# Patient Record
Sex: FEMALE | Race: WHITE | NOT HISPANIC OR LATINO | URBAN - METROPOLITAN AREA
[De-identification: names, ages, dates, MRNs, and addresses within clinical notes are randomized per-mention and may not be internally consistent; named-entity substitution may affect disease eponyms.]

---

## 2017-05-01 ENCOUNTER — INPATIENT (INPATIENT)
Facility: HOSPITAL | Age: 77
LOS: 1 days | Discharge: HOME | End: 2017-05-03
Attending: FAMILY MEDICINE | Admitting: FAMILY MEDICINE
Payer: MEDICARE

## 2017-05-01 VITALS — HEIGHT: 65 IN | WEIGHT: 149.91 LBS

## 2017-05-01 PROCEDURE — 71010: CPT | Mod: 26

## 2017-05-01 PROCEDURE — 70450 CT HEAD/BRAIN W/O DYE: CPT | Mod: 26

## 2017-05-01 PROCEDURE — 76705 ECHO EXAM OF ABDOMEN: CPT | Mod: 26

## 2017-05-01 NOTE — ED PROVIDER NOTE - NEUROLOGICAL, MLM
Awake, nonverbal (consistent with baseline, as per family), +purposeful movements. Cranial nerves grossly intact.

## 2017-05-01 NOTE — ED PROVIDER NOTE - MEDICAL DECISION MAKING DETAILS
Elderly woman with alzheimers dementia, presenting s/p episode urinary incontinence. Family with pt denies lethargy, fevers, vomiting, acute AMS. Physical exam unremarkable except for evidence of advanced dementia.    Plan labs including CBC, CMP, UA, observe, reassess. Elderly woman with alzheimers dementia, presenting s/p episode urinary incontinence. Family with pt denies lethargy, fevers, vomiting, acute AMS. Physical exam unremarkable except for evidence of advanced dementia.    Plan labs including CBC, CMP, UA, observe, reassess.  Elevated LFTs & TBili --> RUQ abd sono.

## 2017-05-01 NOTE — ED PROVIDER NOTE - ENMT, MLM
Airway patent, Nasal mucosa clear. Mouth with normal mucosa. Oropharynx poorly visualized due to pt noncompliance with exam. Neck is supple and non-tender.

## 2017-05-01 NOTE — ED PROVIDER NOTE - DETAILS:
The scribe's documentation has been prepared under my direction and personally reviewed by me in its entirety. I confirm that the note above accurately reflects all work, treatment, procedures, and medical decision making performed by me (Dr. Stinson).

## 2017-05-01 NOTE — ED PROVIDER NOTE - PROGRESS NOTE DETAILS
Dr. Stinson:  Case signed out to Dr. Gaspar:  [] RUQ abd. sono,  [] expect pt TBD unless sono shows signif. pathology. Case DW Dr. Mcnair and pt will be admitted to the Hospitalist service.

## 2017-05-01 NOTE — ED ADULT NURSE REASSESSMENT NOTE - NS ED NURSE REASSESS COMMENT FT1
Pt received in bed. Appears to be comfortable. Pt nonverbal. CONCEPCION x 4. Does not follow commands. Breathing spontaneously on RA. No SOB or cough. VS as documented. +PPS x 4. Cap refill < 2 secs. abd soft, nondistended, nontender. +BS in all 4 quad. Incontinent of B&B. Skin warm, dry, and intact. Will continue to monitor.

## 2017-05-01 NOTE — ED PROVIDER NOTE - NS ED MD SCRIBE ATTENDING SCRIBE SECTIONS
PAST MEDICAL/SURGICAL/SOCIAL HISTORY/PROGRESS NOTE/PHYSICAL EXAM/RESULTS/DISPOSITION/REVIEW OF SYSTEMS/HISTORY OF PRESENT ILLNESS

## 2017-05-01 NOTE — ED STATDOCS - PROGRESS NOTE DETAILS
78 y/o F presents to the ED s/p urinary incontinence. The pt's sister provides that this has been happening since a few days. Pt's sister notes that she has been slightly confused. Currently pt has increased confusion and has not been responding to questions which is not usual. No h/o fever, chills, dizziness, abd pain, nvd, back injury, neck injury, cp, cough, sob or rash. PMD Dr. Landeros. Further eval in Main ED. Please note that orders have been placed by Dr. Redmond

## 2017-05-01 NOTE — ED PROVIDER NOTE - OBJECTIVE STATEMENT
76 y/o F presents to the ED for frequent UTIs, urinary incontinence, and increased confusion over the passed few days. PMD Dr. Landeros. 76 y/o F with a h/o dementia, presents to the ED for frequent UTIs, BIB family for an episode of urinary incontinence today. Family member states pt had 2 episodes of urinary incontinence in the passed 6 months. No recent falls or trauma. No fevers or vomiting. Good appetite. At baseline, pt talks less in the morning. PMD Dr. Landeros, Dr. Cabrales (cardio).

## 2017-05-01 NOTE — ED ADULT NURSE NOTE - OBJECTIVE STATEMENT
Per son Yared and sister Faith, pt has new confusion.  Incontinent of urine, difficulty following commands, steady on feet.

## 2017-05-02 DIAGNOSIS — G30.9 ALZHEIMER'S DISEASE, UNSPECIFIED: ICD-10-CM

## 2017-05-02 DIAGNOSIS — R94.5 ABNORMAL RESULTS OF LIVER FUNCTION STUDIES: ICD-10-CM

## 2017-05-02 DIAGNOSIS — I25.119 ATHEROSCLEROTIC HEART DISEASE OF NATIVE CORONARY ARTERY WITH UNSPECIFIED ANGINA PECTORIS: ICD-10-CM

## 2017-05-02 DIAGNOSIS — R32 UNSPECIFIED URINARY INCONTINENCE: ICD-10-CM

## 2017-05-02 DIAGNOSIS — K83.8 OTHER SPECIFIED DISEASES OF BILIARY TRACT: ICD-10-CM

## 2017-05-02 LAB
ADD ON TEST-SPECIMEN IN LAB: SIGNIFICANT CHANGE UP
AFP-TM SERPL-MCNC: 1.9 NG/ML — SIGNIFICANT CHANGE UP
ALBUMIN SERPL ELPH-MCNC: 2.9 G/DL — LOW (ref 3.3–5)
ALP SERPL-CCNC: 345 U/L — HIGH (ref 40–120)
ALT FLD-CCNC: 132 U/L — HIGH (ref 12–78)
AMYLASE P1 CFR SERPL: 28 U/L — SIGNIFICANT CHANGE UP (ref 25–115)
ANION GAP SERPL CALC-SCNC: 9 MMOL/L — SIGNIFICANT CHANGE UP (ref 5–17)
AST SERPL-CCNC: 120 U/L — HIGH (ref 15–37)
BASOPHILS # BLD AUTO: 0.1 K/UL — SIGNIFICANT CHANGE UP (ref 0–0.2)
BASOPHILS NFR BLD AUTO: 1.4 % — SIGNIFICANT CHANGE UP (ref 0–2)
BILIRUB DIRECT SERPL-MCNC: 1 MG/DL — HIGH (ref 0–0.2)
BILIRUB INDIRECT FLD-MCNC: 1.2 MG/DL — HIGH (ref 0.2–1)
BILIRUB SERPL-MCNC: 2.2 MG/DL — HIGH (ref 0.2–1.2)
BUN SERPL-MCNC: 10 MG/DL — SIGNIFICANT CHANGE UP (ref 7–23)
CALCIUM SERPL-MCNC: 9.2 MG/DL — SIGNIFICANT CHANGE UP (ref 8.5–10.1)
CEA SERPL-MCNC: 0.8 NG/ML — SIGNIFICANT CHANGE UP (ref 0–3.8)
CHLORIDE SERPL-SCNC: 108 MMOL/L — SIGNIFICANT CHANGE UP (ref 96–108)
CO2 SERPL-SCNC: 27 MMOL/L — SIGNIFICANT CHANGE UP (ref 22–31)
CREAT SERPL-MCNC: 0.85 MG/DL — SIGNIFICANT CHANGE UP (ref 0.5–1.3)
CULTURE RESULTS: SIGNIFICANT CHANGE UP
EOSINOPHIL # BLD AUTO: 0.3 K/UL — SIGNIFICANT CHANGE UP (ref 0–0.5)
EOSINOPHIL NFR BLD AUTO: 3.9 % — SIGNIFICANT CHANGE UP (ref 0–6)
GLUCOSE SERPL-MCNC: 112 MG/DL — HIGH (ref 70–99)
HAV IGM SER-ACNC: SIGNIFICANT CHANGE UP
HBA1C BLD-MCNC: 6.3 % — HIGH (ref 4–5.6)
HBV CORE IGM SER-ACNC: SIGNIFICANT CHANGE UP
HBV SURFACE AG SER-ACNC: SIGNIFICANT CHANGE UP
HCT VFR BLD CALC: 39.2 % — SIGNIFICANT CHANGE UP (ref 34.5–45)
HCV AB S/CO SERPL IA: 0.14 S/CO — SIGNIFICANT CHANGE UP
HCV AB SERPL-IMP: SIGNIFICANT CHANGE UP
HGB BLD-MCNC: 13.3 G/DL — SIGNIFICANT CHANGE UP (ref 11.5–15.5)
LIDOCAIN IGE QN: 121 U/L — SIGNIFICANT CHANGE UP (ref 73–393)
LYMPHOCYTES # BLD AUTO: 1.8 K/UL — SIGNIFICANT CHANGE UP (ref 1–3.3)
LYMPHOCYTES # BLD AUTO: 23.9 % — SIGNIFICANT CHANGE UP (ref 13–44)
MAGNESIUM SERPL-MCNC: 2.1 MG/DL — SIGNIFICANT CHANGE UP (ref 1.8–2.4)
MCHC RBC-ENTMCNC: 32 PG — SIGNIFICANT CHANGE UP (ref 27–34)
MCHC RBC-ENTMCNC: 33.9 GM/DL — SIGNIFICANT CHANGE UP (ref 32–36)
MCV RBC AUTO: 94.4 FL — SIGNIFICANT CHANGE UP (ref 80–100)
MONOCYTES # BLD AUTO: 0.7 K/UL — SIGNIFICANT CHANGE UP (ref 0–0.9)
MONOCYTES NFR BLD AUTO: 10.1 % — SIGNIFICANT CHANGE UP (ref 2–14)
NEUTROPHILS # BLD AUTO: 4.5 K/UL — SIGNIFICANT CHANGE UP (ref 1.8–7.4)
NEUTROPHILS NFR BLD AUTO: 60.7 % — SIGNIFICANT CHANGE UP (ref 43–77)
PLATELET # BLD AUTO: 190 K/UL — SIGNIFICANT CHANGE UP (ref 150–400)
POTASSIUM SERPL-MCNC: 4 MMOL/L — SIGNIFICANT CHANGE UP (ref 3.5–5.3)
POTASSIUM SERPL-SCNC: 4 MMOL/L — SIGNIFICANT CHANGE UP (ref 3.5–5.3)
PROT SERPL-MCNC: 6.8 GM/DL — SIGNIFICANT CHANGE UP (ref 6–8.3)
RBC # BLD: 4.15 M/UL — SIGNIFICANT CHANGE UP (ref 3.8–5.2)
RBC # FLD: 12.4 % — SIGNIFICANT CHANGE UP (ref 10.3–14.5)
SODIUM SERPL-SCNC: 144 MMOL/L — SIGNIFICANT CHANGE UP (ref 135–145)
SPECIMEN SOURCE: SIGNIFICANT CHANGE UP
TSH SERPL-MCNC: <0.005 UIU/ML — LOW (ref 0.36–3.74)
WBC # BLD: 7.4 K/UL — SIGNIFICANT CHANGE UP (ref 3.8–10.5)
WBC # FLD AUTO: 7.4 K/UL — SIGNIFICANT CHANGE UP (ref 3.8–10.5)

## 2017-05-02 PROCEDURE — 99285 EMERGENCY DEPT VISIT HI MDM: CPT

## 2017-05-02 RX ORDER — METOPROLOL TARTRATE 50 MG
1 TABLET ORAL
Qty: 0 | Refills: 0 | COMMUNITY

## 2017-05-02 RX ORDER — ERGOCALCIFEROL 1.25 MG/1
1 CAPSULE ORAL
Qty: 0 | Refills: 0 | COMMUNITY

## 2017-05-02 RX ORDER — METOPROLOL TARTRATE 50 MG
25 TABLET ORAL DAILY
Qty: 0 | Refills: 0 | Status: DISCONTINUED | OUTPATIENT
Start: 2017-05-02 | End: 2017-05-03

## 2017-05-02 RX ORDER — ATORVASTATIN CALCIUM 80 MG/1
1 TABLET, FILM COATED ORAL
Qty: 0 | Refills: 0 | COMMUNITY

## 2017-05-02 RX ORDER — DEXTROSE 50 % IN WATER 50 %
12.5 SYRINGE (ML) INTRAVENOUS ONCE
Qty: 0 | Refills: 0 | Status: DISCONTINUED | OUTPATIENT
Start: 2017-05-02 | End: 2017-05-03

## 2017-05-02 RX ORDER — DORZOLAMIDE HYDROCHLORIDE 20 MG/ML
1 SOLUTION/ DROPS OPHTHALMIC
Qty: 0 | Refills: 0 | COMMUNITY

## 2017-05-02 RX ORDER — MEMANTINE HYDROCHLORIDE 10 MG/1
1 TABLET ORAL
Qty: 0 | Refills: 0 | COMMUNITY

## 2017-05-02 RX ORDER — SODIUM CHLORIDE 9 MG/ML
1000 INJECTION, SOLUTION INTRAVENOUS
Qty: 0 | Refills: 0 | Status: DISCONTINUED | OUTPATIENT
Start: 2017-05-02 | End: 2017-05-03

## 2017-05-02 RX ORDER — ATORVASTATIN CALCIUM 80 MG/1
20 TABLET, FILM COATED ORAL AT BEDTIME
Qty: 0 | Refills: 0 | Status: DISCONTINUED | OUTPATIENT
Start: 2017-05-02 | End: 2017-05-03

## 2017-05-02 RX ORDER — L.ACIDOPH/B.ANIMALIS/B.LONGUM 15B CELL
0 CAPSULE ORAL
Qty: 0 | Refills: 0 | COMMUNITY

## 2017-05-02 RX ORDER — MEMANTINE HYDROCHLORIDE 10 MG/1
10 TABLET ORAL
Qty: 0 | Refills: 0 | Status: DISCONTINUED | OUTPATIENT
Start: 2017-05-02 | End: 2017-05-03

## 2017-05-02 RX ORDER — DONEPEZIL HYDROCHLORIDE 10 MG/1
1 TABLET, FILM COATED ORAL
Qty: 0 | Refills: 0 | COMMUNITY

## 2017-05-02 RX ORDER — CLOPIDOGREL BISULFATE 75 MG/1
1 TABLET, FILM COATED ORAL
Qty: 0 | Refills: 0 | COMMUNITY

## 2017-05-02 RX ORDER — CLOPIDOGREL BISULFATE 75 MG/1
75 TABLET, FILM COATED ORAL DAILY
Qty: 0 | Refills: 0 | Status: DISCONTINUED | OUTPATIENT
Start: 2017-05-02 | End: 2017-05-03

## 2017-05-02 RX ORDER — DONEPEZIL HYDROCHLORIDE 10 MG/1
10 TABLET, FILM COATED ORAL AT BEDTIME
Qty: 0 | Refills: 0 | Status: DISCONTINUED | OUTPATIENT
Start: 2017-05-02 | End: 2017-05-03

## 2017-05-02 RX ORDER — DORZOLAMIDE HYDROCHLORIDE 20 MG/ML
1 SOLUTION/ DROPS OPHTHALMIC
Qty: 0 | Refills: 0 | Status: DISCONTINUED | OUTPATIENT
Start: 2017-05-02 | End: 2017-05-03

## 2017-05-02 RX ORDER — GLUCAGON INJECTION, SOLUTION 0.5 MG/.1ML
1 INJECTION, SOLUTION SUBCUTANEOUS ONCE
Qty: 0 | Refills: 0 | Status: DISCONTINUED | OUTPATIENT
Start: 2017-05-02 | End: 2017-05-03

## 2017-05-02 RX ORDER — INSULIN LISPRO 100/ML
VIAL (ML) SUBCUTANEOUS
Qty: 0 | Refills: 0 | Status: DISCONTINUED | OUTPATIENT
Start: 2017-05-02 | End: 2017-05-03

## 2017-05-02 RX ORDER — HALOPERIDOL DECANOATE 100 MG/ML
1 INJECTION INTRAMUSCULAR ONCE
Qty: 0 | Refills: 0 | Status: COMPLETED | OUTPATIENT
Start: 2017-05-02 | End: 2017-05-02

## 2017-05-02 RX ORDER — DEXTROSE 50 % IN WATER 50 %
1 SYRINGE (ML) INTRAVENOUS ONCE
Qty: 0 | Refills: 0 | Status: DISCONTINUED | OUTPATIENT
Start: 2017-05-02 | End: 2017-05-03

## 2017-05-02 RX ADMIN — DONEPEZIL HYDROCHLORIDE 10 MILLIGRAM(S): 10 TABLET, FILM COATED ORAL at 21:52

## 2017-05-02 RX ADMIN — Medication 1 TABLET(S): at 11:47

## 2017-05-02 RX ADMIN — MEMANTINE HYDROCHLORIDE 10 MILLIGRAM(S): 10 TABLET ORAL at 17:31

## 2017-05-02 RX ADMIN — CLOPIDOGREL BISULFATE 75 MILLIGRAM(S): 75 TABLET, FILM COATED ORAL at 11:47

## 2017-05-02 RX ADMIN — DORZOLAMIDE HYDROCHLORIDE 1 DROP(S): 20 SOLUTION/ DROPS OPHTHALMIC at 17:31

## 2017-05-02 RX ADMIN — Medication 1: at 14:41

## 2017-05-02 RX ADMIN — ATORVASTATIN CALCIUM 20 MILLIGRAM(S): 80 TABLET, FILM COATED ORAL at 21:52

## 2017-05-02 NOTE — H&P ADULT - NSHPPHYSICALEXAM_GEN_ALL_CORE
PHYSICAL EXAM:      Constitutional: NAD, ill-groomed, well-developed  HEENT: PERRLA, EOMI, Normal Hearing  Neck: No LAD, No JVD  Back: Normal spine flexure, No CVA tenderness  Respiratory: CTABL  Cardiovascular: S1 and S2, RRR, no M/G/R  Gastrointestinal: BS+, soft, NT/ND  Extremities: No peripheral edema  Vascular: 2+ peripheral pulses  Neurological: A/O x 1, pt moving all limbs  Psychiatric: confused  Skin: No rashes

## 2017-05-02 NOTE — PATIENT PROFILE ADULT. - VISION (WITH CORRECTIVE LENSES IF THE PATIENT USUALLY WEARS THEM):
Normal vision: sees adequately in most situations; can see medication labels, newsprint/unable to assess r/t advanced dementia

## 2017-05-02 NOTE — GOALS OF CARE CONVERSATION - PERSONAL ADVANCE DIRECTIVE - TREATMENT GUIDELINE COMMENT
Plan left to see what GI workup reveals to help decide which leel of home care is most appropriate.  With dx of advance dementia pt is on cusp of eligibility for hospice but GI  dx may change that picture.      Spent 47 minutes speaking with pts HCP Martina re: advance care planning, advance directives, therapeutic alternatives and home care options

## 2017-05-02 NOTE — GOALS OF CARE CONVERSATION - PERSONAL ADVANCE DIRECTIVE - CONVERSATION DETAILS
reviewed the role of palliative medicine, pts course over last several years and current condition.  Pt at baseline cognitively but with new finding of bile duct dilatation.  Awaiting GI input for further workup- Martina notes she is agreeable to further workup but does not wish for anything too invasive per her mother's wishes.  Re-affirmed preference for DNR and DNI order.  We did also review 3 levels of home care including basic, palliative and hospice home care.

## 2017-05-02 NOTE — CONSULT NOTE ADULT - SUBJECTIVE AND OBJECTIVE BOX
HPI: Pt is a 77y old Female with hx of dementia FAST 6E/7A, CAD s/p NSTEMI, HLD admitted from home brought in by sister for episode of urinary incontinence.  Pt noted to have elevated LFTS on admission and imaging revealed dilated intra and extrahepatic bile ducts.    Pt is unable to provide any hx due to advanced dementia but daughter/HCP Martina present at bedside at time of my visit.    Martina explains pt has had issues with cognition dating back 18 yrs when her  .  She presently lives with her 83 yo sister and they have no extra help.  Martina states she and her brother make daily visits to check on pt at home and are int he process of obtaining aides through a long term care insurance policy. she reports at baseline pt doesn't really speak much- she may say a few words here and there.  She does ambulate without an assistive device.  She can sometimes dress herself but other times cannot.  She is incontinent of bladder and bowels at times but sometimes she is aware and is continent.  Martina states she eats well but notices she has seemed to lose a little weight visually but attributes that to the warmer weather and pt being outside and walking more.    Martina explains for the most part, pt has appeared uncomfortable and that if something hurts her she will let you know.    At time of my visit pt nonverbal and smiles at me but unable to answer any questions so unable to provide HPI, ROS but appears comfortable.      PAIN: ( )Yes   (x )No visually and per chart      DYSPNEA: ( ) Yes  (x ) No visually and per chart      PAST MEDICAL & SURGICAL HISTORY:  Dementia  Glaucoma  CAD s/p NSTEMI  Hyperlipidemia  choleycystectomy      SOCIAL HX: Lives at home with her sister.  Needs assist with some ADLs.  ambulates independently.   x 18 yrs    Hx opiate tolerance ( )YES  (x )NO    Baseline ADLs  (Prior to Admission)  ( ) Independent   (x )Dependent - some assistance required    FAMILY HISTORY:  Pt unable to provide due to dementia      Review of Systems:  Unable to obtain/Limited due to: advanced dementia      PHYSICAL EXAM:    Vital Signs Last 24 Hrs  T(C): 36.3, Max: 36.7 ( @ 17:19)  T(F): 97.4, Max: 98.1 ( @ 17:19)  HR: 67 (67 - 99)  BP: 124/65 (124/65 - 151/91)  BP(mean): 107 (107 - 107)  RR: 16 (16 - 20)  SpO2: 98% (98% - 99%)  Daily Height in cm: 165.1 (01 May 2017 17:10)    Daily     PPSV2:  40 %  FAST: 6E/7A    General: awake, calm, comfortable, seated in chair  Mental Status: awake, oriented to person, follows few direct commands, not answering questions  HEENT: EOMI, dry oral mucosa  Lungs: CTA b/l  Cardiac: S1S2+  GI: soft, NT, ND, normoactive bowel sounds present  : voiding, incontinent at times (baseline per daughter)  Ext: moves all 4 exts spontaneously  Neuro: awake, nonverbal, follows few commands      LABS:                        13.3   7.4   )-----------( 190      ( 02 May 2017 05:37 )             39.2     05-    144  |  108  |  10  ----------------------------<  112<H>  4.0   |  27  |  0.85    Ca    9.2      02 May 2017 05:37  Mg     2.1     05-    TPro  6.8  /  Alb  2.9<L>  /  TBili  2.2<H>  /  DBili  1.0<H>  /  AST  120<H>  /  ALT  132<H>  /  AlkPhos  345<H>  05-      Albumin: Albumin, Serum: 2.9 g/dL ( @ 05:37)      Allergies    No Known Allergies          MEDICATIONS  (STANDING):  atorvastatin 20milliGRAM(s) Oral at bedtime  clopidogrel Tablet 75milliGRAM(s) Oral daily  metoprolol succinate ER 25milliGRAM(s) Oral daily  donepezil 10milliGRAM(s) Oral at bedtime  memantine 10milliGRAM(s) Oral two times a day  dorzolamide 2% Ophthalmic Solution 1Drop(s) Both EYES two times a day  multivitamin 1Tablet(s) Oral daily  insulin lispro (HumaLOG) corrective regimen sliding scale  SubCutaneous three times a day before meals  dextrose 5%. 1000milliLiter(s) IV Continuous <Continuous>  dextrose 50% Injectable 12.5Gram(s) IV Push once    MEDICATIONS  (PRN):  dextrose Gel 1Dose(s) Oral once PRN Blood Glucose LESS THAN 70 milliGRAM(s)/deciliter  glucagon  Injectable 1milliGRAM(s) IntraMuscular once PRN Glucose LESS THAN 70 milligrams/deciliter      RADIOLOGY/ADDITIONAL STUDIES:    EXAM:  US ABDOMEN LIMITED                        PROCEDURE DATE:  2017    FINDINGS:    The liver is normal in size and echogenicity. The liver measures 14.6 cm   in craniocaudal dimension. The main portal vein is patent and   demonstrates hepatopedal flow with respiratory phasicity.  Moderate to severe intrahepatic and extrahepatic biliary duct dilatation   is evident. The common bile duct measures 32 mm. Post cholecystectomy.  The right kidney measures 8.8 cm in length.  There is no right   hydronephrosis, renal calculus, or contour-deforming mass.  Visualized pancreatic duct at the level of the pancreatic neck and body   measures 3.4 mm, within normal limits. Pancreatic tail and adjacent body   is obscured by overlying bowel gas.    EXAM:  CHEST SINGLE VIEW FRONTAL                        PROCEDURE DATE:  2017    FINDINGS:  Prior study dated 6/10/2016 was available for review.    The lungs are clear.  No pleural abnormality is seen.    The heart and mediastinum appear intact.       EXAM:  CT BRAIN                        PROCEDURE DATE:  2017    Comparison: 6/10    FINDINGS:    There is no mass, mass effect or midline shift. There are no intraaxial   or extraaxial fluid collections. There is no evidence for acute segmental   infarct.  Small vessel ischemic changes and atrophy, grossly stable.   Ventricular system and sulcal pattern are within normal limits for the   patient's age.  The visualized sinuses and mastoid air cells are unremarkable.  Bones and soft tissues are normal.

## 2017-05-02 NOTE — H&P ADULT - PROBLEM SELECTOR PLAN 1
..  - s/p cholecystectomy  - Ultrasound noted: biliary system dilatation both intra and extra hepatic r/o obstruction   - Admit to medicine floor, GI consult   - Tumor markers, liapse, amylase ordered  - Palliative care consult   - Cardiac diet, fall prevention

## 2017-05-02 NOTE — CONSULT NOTE ADULT - ASSESSMENT
Pt is a 77y old Female with hx of dementia FAST 6E/7A, CAD s/p NSTEMI, HLD admitted from home brought in by sister for episode of urinary incontinence.  Pt noted to have elevated LFTS on admission and imaging revealed dilated intra and extrahepatic bile ducts.    1)Urinary Incontinence  -Per daughter this in not deviation from baseline  -Intermittent urinary incontinence is expected part of advanced dementia  -Skin care  -Scheduled toileting if possible    2)Transaminitis, Bile Duct Dilatation    3)Dementia    4)Advance Directives Pt is a 77y old Female with hx of dementia FAST 6E/7A, CAD s/p NSTEMI, HLD admitted from home brought in by sister for episode of urinary incontinence.  Pt noted to have elevated LFTS on admission and imaging revealed dilated intra and extrahepatic bile ducts.    1)Urinary Incontinence  -Per daughter this in not deviation from baseline  -Intermittent urinary incontinence is expected part of advanced dementia  -Skin care  -Scheduled toileting if possible    2)Transaminitis, Bile Duct Dilatation  Incidentally found on admission  -Pt currently asymptomatic  -Abd US reviewed  -Gi eval pending    3)Dementia  -Per daughters hx FAST 6E, 7A  -Fall and aspiration precautions  -Cont Aricept, Namenda  -Would walk pt as possible as daughter notes she likes to walk    4)Advance Directives  -Pt does not have capacity to make medical decisions due to advanced dementia  -Pt has HCP naming her daughter Martina as her primary agent  -Pt has DNR and DNI order in place per HCP Martina  -Met with Martina at bedside to review plan, goals of care- see goals of care note dated today for details  -Team will follow

## 2017-05-02 NOTE — H&P ADULT - ASSESSMENT
76 y/o F with a h/o Advanced dementia, CAD NSTEMI in 8/2015, Hyperlipidemia, who was brought by family for an episode of urinary incontinence today.  Family member states pt had 2 episodes of urinary incontinence in the passed 6 months. No recent falls or trauma. No fevers or vomiting. Good appetite. At baseline, pt doesn't talk much due to dementia.     Pt was found to have elevated LFTs, ALP and bilirubin and sonogram suggested biliary ductal dilatation.

## 2017-05-02 NOTE — H&P ADULT - HISTORY OF PRESENT ILLNESS
78 y/o F with a h/o Advanced dementia, CAD NSTEMI in 8/2015, Hyperlipidemia, who was brought by family for an episode of urinary incontinence today.  Family member states pt had 2 episodes of urinary incontinence in the passed 6 months. No recent falls or trauma. No fevers or vomiting. Good appetite. At baseline, pt doesn't talk much due to dementia.     Pt was found to have elevated LFTs, ALP and bilirubin and sonogram suggested biliary ductal dilatation.      Pt overnight became delirious in ER trying to get out of bed and was not oriented to time, placer or person.

## 2017-05-03 VITALS
TEMPERATURE: 99 F | HEART RATE: 86 BPM | SYSTOLIC BLOOD PRESSURE: 93 MMHG | OXYGEN SATURATION: 96 % | RESPIRATION RATE: 19 BRPM | DIASTOLIC BLOOD PRESSURE: 55 MMHG

## 2017-05-03 LAB
ALBUMIN SERPL ELPH-MCNC: 2.8 G/DL — LOW (ref 3.3–5)
ALP SERPL-CCNC: 318 U/L — HIGH (ref 40–120)
ALT FLD-CCNC: 110 U/L — HIGH (ref 12–78)
ANION GAP SERPL CALC-SCNC: 9 MMOL/L — SIGNIFICANT CHANGE UP (ref 5–17)
AST SERPL-CCNC: 87 U/L — HIGH (ref 15–37)
BILIRUB SERPL-MCNC: 2.2 MG/DL — HIGH (ref 0.2–1.2)
BUN SERPL-MCNC: 11 MG/DL — SIGNIFICANT CHANGE UP (ref 7–23)
CALCIUM SERPL-MCNC: 9.1 MG/DL — SIGNIFICANT CHANGE UP (ref 8.5–10.1)
CHLORIDE SERPL-SCNC: 109 MMOL/L — HIGH (ref 96–108)
CO2 SERPL-SCNC: 27 MMOL/L — SIGNIFICANT CHANGE UP (ref 22–31)
CREAT SERPL-MCNC: 0.77 MG/DL — SIGNIFICANT CHANGE UP (ref 0.5–1.3)
GLUCOSE SERPL-MCNC: 118 MG/DL — HIGH (ref 70–99)
POTASSIUM SERPL-MCNC: 4.2 MMOL/L — SIGNIFICANT CHANGE UP (ref 3.5–5.3)
POTASSIUM SERPL-SCNC: 4.2 MMOL/L — SIGNIFICANT CHANGE UP (ref 3.5–5.3)
PROT SERPL-MCNC: 6.5 GM/DL — SIGNIFICANT CHANGE UP (ref 6–8.3)
SODIUM SERPL-SCNC: 145 MMOL/L — SIGNIFICANT CHANGE UP (ref 135–145)
T3FREE SERPL-MCNC: 3.31 PG/ML — SIGNIFICANT CHANGE UP (ref 1.8–4.6)
T4 FREE SERPL-MCNC: 1.31 NG/DL — SIGNIFICANT CHANGE UP (ref 0.76–1.46)

## 2017-05-03 PROCEDURE — 74181 MRI ABDOMEN W/O CONTRAST: CPT | Mod: 26

## 2017-05-03 RX ADMIN — Medication 1 TABLET(S): at 14:10

## 2017-05-03 RX ADMIN — DORZOLAMIDE HYDROCHLORIDE 1 DROP(S): 20 SOLUTION/ DROPS OPHTHALMIC at 05:22

## 2017-05-03 RX ADMIN — CLOPIDOGREL BISULFATE 75 MILLIGRAM(S): 75 TABLET, FILM COATED ORAL at 14:09

## 2017-05-03 NOTE — DISCHARGE NOTE ADULT - HOSPITAL COURSE
76 y/o F with a h/o Advanced dementia, CAD,  NSTEMI in 2015, Hyperlipidemia, who was brought by family for an episode of urinary incontinence   Family member states pt had 2 episodes of urinary incontinence in the passed 6 months. No recent falls or trauma. No fevers or vomiting. Good appetite. At baseline, pt doesn't talk much due to dementia.   Pt was found to have elevated LFTs, sono with significant billiary dilation. She was seen by GI, underwent MRCP which showed no stones/obstruction. She is tolerating PO, remains at baseline and will be discharged home today. NO further w/u planned per GI.    Vital Signs Last 24 Hrs  T(C): 37.2, Max: 37.2 ( @ 15:17)  T(F): 99, Max: 99 ( @ 15:17)  HR: 86 (66 - 86)  BP: 93/55 (93/55 - 141/78)  BP(mean): 59 (59 - 59)  RR: 19 (16 - 19)  SpO2: 96% (96% - 98%)    PHYSICAL EXAM:    GENERAL: Comfortable, no acute distress   HEAD:  Normocephalic, atraumatic  EYES: EOMI, PERRLA  HEENT: Moist mucous membranes  NECK: Supple, No JVD  NERVOUS SYSTEM:  confused, non focal  CHEST/LUNG: Clear to auscultation bilaterally  HEART: Regular rate and rhythm  ABDOMEN: Soft, Nontender, Nondistended, Bowel sounds present  GENITOURINARY: Voiding, no palpable bladder  EXTREMITIES:   No clubbing, cyanosis, or edema  MUSCULOSKELTAL- No muscle tenderness, no joint tenderness  SKIN-no rash    LABS:                        13.3   7.4   )-----------( 190      ( 02 May 2017 05:37 )             39.2     -    145  |  109<H>  |  11  ----------------------------<  118<H>  4.2   |  27  |  0.77    Ca    9.1      03 May 2017 08:34  Mg     2.1     -    TPro  6.5  /  Alb  2.8<L>  /  TBili  2.2<H>  /  DBili  x   /  AST  87<H>  /  ALT  110<H>  /  AlkPhos  318<H>  -      Urinalysis Basic - ( 01 May 2017 18:32 )    Color: Yellow / Appearance: Slightly Turbid / S.020 / pH: x  Gluc: x / Ketone: Negative  / Bili: Negative / Urobili: 1 mg/dL   Blood: x / Protein: Negative mg/dL / Nitrite: Negative   Leuk Esterase: Trace / RBC: 0-2 /HPF / WBC 0-2   Sq Epi: x / Non Sq Epi: Occasional / Bacteria: Few      CAPILLARY BLOOD GLUCOSE  138 (03 May 2017 12:03)  124 (03 May 2017 05:31)  101 (03 May 2017 01:01)  126 (02 May 2017 21:34)    MRCP IMPRESSION:    Severe diffuse dilatation of the intra and extra hepatic biliary tree   measuring up to 39 mm in the common hepatic segment and 32 mm in the   common bile duct. No obstructing lesion or intraductal calculus. No focal   or irregular wall thickening. Normal caliber pancreatic duct. Ampullary   pathology is not excluded.    Final diagnosis    1. Biliary dilation without obstruction  2. Advanced alzheimer's dementia:  3.h/o CAD    Time taken in preparation for discharge 65 minutes.  left message for pcp dr. ponce.  dc summary will be faxed.

## 2017-05-03 NOTE — PROGRESS NOTE ADULT - SUBJECTIVE AND OBJECTIVE BOX
HPI: Pt is a 77y old Female with hx of dementia FAST 6E/7A, CAD s/p NSTEMI, HLD admitted from home brought in by sister for episode of urinary incontinence.  Pt noted to have elevated LFTS on admission and imaging revealed dilated intra and extrahepatic bile ducts.    Pt is unable to provide any hx due to advanced dementia but daughter/HCP Martina present at bedside at time of my visit.    Pt seen and examined by me for followup of bile duct dilatation, goals of care    Martina reports pt has been at baseline- no new symptoms that she has noticed.  Pt ate lunch and had MRCP this morning     Pt appears calm and comfortable at time of my visit      PAIN: ( )Yes   (x )No visually and per chart      DYSPNEA: ( ) Yes  (x ) No visually and per chart      Review of Systems:  Unable to obtain/Limited due to: advanced dementia      PHYSICAL EXAM:    Vital Signs Last 24 Hrs  T(C): 36.7, Max: 36.7 (05-03 @ 05:25)  T(F): 98, Max: 98 (05-03 @ 05:25)  HR: 66 (66 - 82)  BP: 141/78 (127/69 - 142/63)  BP(mean): --  RR: 16 (16 - 16)  SpO2: 98% (96% - 98%)      PPSV2:  40 %  FAST: 6E/7A    General: awake, calm, comfortable, seated in chair  Mental Status: awake, oriented to person, follows few direct commands, not answering questions  HEENT: EOMI, dry oral mucosa  Lungs: CTA b/l  Cardiac: S1S2+  GI: soft, NT, ND, normoactive bowel sounds present  : voiding, incontinent at times (baseline per daughter)  Ext: moves all 4 exts spontaneously  Neuro: awake, nonverbal, follows few commands      LABS                          13.3   7.4   )-----------( 190      ( 02 May 2017 05:37 )             39.2     05-03    145  |  109<H>  |  11  ----------------------------<  118<H>  4.2   |  27  |  0.77    Ca    9.1      03 May 2017 08:34  Mg     2.1     05-02    TPro  6.5  /  Alb  2.8<L>  /  TBili  2.2<H>  /  DBili  x   /  AST  87<H>  /  ALT  110<H>  /  AlkPhos  318<H>  05-03      RADIOLOGY/ADDITIONAL STUDIES:    Results of MRCP not yet available

## 2017-05-03 NOTE — DISCHARGE NOTE ADULT - CARE PLAN
Principal Discharge DX:	Dilated cbd, acquired  Goal:	no further workup unless symptomatic  Instructions for follow-up, activity and diet:	follow up with pcp as per routine

## 2017-05-03 NOTE — DISCHARGE NOTE ADULT - VISION (WITH CORRECTIVE LENSES IF THE PATIENT USUALLY WEARS THEM):
Partially impaired: cannot see medication labels or newsprint, but can see obstacles in path, and the surrounding layout; can count fingers at arm's length/unable to assess r/t advanced dementia

## 2017-05-03 NOTE — PROGRESS NOTE ADULT - ASSESSMENT
Pt is a 77y old Female with hx of dementia FAST 6E/7A, CAD s/p NSTEMI, HLD admitted from home brought in by sister for episode of urinary incontinence.  Pt noted to have elevated LFTS on admission and imaging revealed dilated intra and extrahepatic bile ducts.    1)Urinary Incontinence  -Per daughter this in not deviation from baseline  -Intermittent urinary incontinence is expected part of advanced dementia  -Skin care  -Scheduled toileting if possible    2)Transaminitis, Bile Duct Dilatation  -Incidentally found on admission  -Pt currently asymptomatic  -Abd US reviewed  -Gi input reviewed  -F/u results of MRCP    3)Dementia  -Per daughters hx FAST 6E, 7A  -Fall and aspiration precautions  -Cont Aricept, Namenda  -Would walk pt as possible as daughter notes she likes to walk    4)Advance Directives  -Pt does not have capacity to make medical decisions due to advanced dementia  -Pt has HCP naming her daughter Martina as her primary agent  -Pt has DNR and DNI order in place per HCP Martina  -Met with Martina at bedside to review plan, goals of care- see goals of care note dated 5/2 for details  -Team will follow

## 2017-05-03 NOTE — PROGRESS NOTE ADULT - SUBJECTIVE AND OBJECTIVE BOX
Pt seen and examined earlier today. Was in hallway. Sleeping but arousable. Unable to provide any history.     Review of system- unable to obtain due to dementia.            T(C): 36.7, Max: 36.7 ( @ 05:25)  HR: 66 (66 - 82)  BP: 141/78 (127/69 - 142/63)  RR: 16 (16 - 16)  SpO2: 98% (96% - 98%)    PHYSICAL EXAM:    GENERAL: Comfortable, no acute distress  HEAD:  Atraumatic, Normocephalic  EYES: EOMI, PERRLA  HEENT: Moist mucous membranes  NECK: Supple, No JVD  NERVOUS SYSTEM: confused, does not follow commands.   CHEST/LUNG: Clear to auscultation bilaterally  HEART: Regular rate and rhythm; No murmurs, rubs, or gallops  ABDOMEN: Soft, Nontender, Nondistended; Bowel sounds present  GENITOURINARY- Voiding, no palpable bladder  EXTREMITIES:  No clubbing, cyanosis, or edema  MUSCULOSKELTAL- No muscle tenderness, No joint tenderness  SKIN-no rash        LABS:                        13.3   7.4   )-----------( 190      ( 02 May 2017 05:37 )             39.2     -    145  |  109<H>  |  11  ----------------------------<  118<H>  4.2   |  27  |  0.77    Ca    9.1      03 May 2017 08:34  Mg     2.1     -    TPro  6.5  /  Alb  2.8<L>  /  TBili  2.2<H>  /  DBili  x   /  AST  87<H>  /  ALT  110<H>  /  AlkPhos  318<H>        Urinalysis Basic - ( 01 May 2017 18:32 )    Color: Yellow / Appearance: Slightly Turbid / S.020 / pH: x  Gluc: x / Ketone: Negative  / Bili: Negative / Urobili: 1 mg/dL   Blood: x / Protein: Negative mg/dL / Nitrite: Negative   Leuk Esterase: Trace / RBC: 0-2 /HPF / WBC 0-2   Sq Epi: x / Non Sq Epi: Occasional / Bacteria: Few        CAPILLARY BLOOD GLUCOSE  124 (03 May 2017 05:31)  101 (03 May 2017 01:01)  126 (02 May 2017 21:34)  108 (02 May 2017 16:39)  186 (02 May 2017 11:20)        MEDS  atorvastatin 20milliGRAM(s) Oral at bedtime  clopidogrel Tablet 75milliGRAM(s) Oral daily  metoprolol succinate ER 25milliGRAM(s) Oral daily  donepezil 10milliGRAM(s) Oral at bedtime  memantine 10milliGRAM(s) Oral two times a day  dorzolamide 2% Ophthalmic Solution 1Drop(s) Both EYES two times a day  multivitamin 1Tablet(s) Oral daily  insulin lispro (HumaLOG) corrective regimen sliding scale  SubCutaneous three times a day before meals  dextrose 5%. 1000milliLiter(s) IV Continuous <Continuous>  dextrose Gel 1Dose(s) Oral once PRN  dextrose 50% Injectable 12.5Gram(s) IV Push once  glucagon  Injectable 1milliGRAM(s) IntraMuscular once PRN

## 2017-05-03 NOTE — DISCHARGE NOTE ADULT - MEDICATION SUMMARY - MEDICATIONS TO TAKE
I will START or STAY ON the medications listed below when I get home from the hospital:    atorvastatin 40 mg oral tablet  -- 1 tab(s) by mouth once a day  -- Indication: For Dyslipidemia    Plavix 75 mg oral tablet  -- 1 tab(s) by mouth once a day  -- Indication: For Cad    metoprolol succinate 25 mg oral tablet, extended release  -- 1 tab(s) by mouth once a day  -- Indication: For htn    donepezil 10 mg oral tablet  -- 1 tab(s) by mouth once a day (at bedtime)  -- Indication: For Dementia    Namenda 10 mg oral tablet  -- 1 tab(s) by mouth 2 times a day  -- Indication: For Dementa    dorzolamide 2% ophthalmic solution  -- 1 drop(s) in each eye 2 times a day  -- Indication: For glaucoma    Probiotic Formula oral capsule  --  by mouth   -- Indication: For supplement    multivitamin  --     -- Indication: For supplement    Vitamin D2 50,000 intl units (1.25 mg) oral capsule  -- 1 cap(s) by mouth once a week  -- Indication: For supplement

## 2017-05-03 NOTE — DISCHARGE NOTE ADULT - CARE PROVIDER_API CALL
Erasto Lai (MD), Internal Medicine  [address] [place] [address]  Phone: [phone]  Fax: [phone]

## 2017-05-03 NOTE — PROGRESS NOTE ADULT - ASSESSMENT
76 y/o F with a h/o Advanced dementia, CAD,  NSTEMI in 8/2015, Hyperlipidemia, who was brought by family for an episode of urinary incontinence   Family member states pt had 2 episodes of urinary incontinence in the passed 6 months. No recent falls or trauma. No fevers or vomiting. Good appetite. At baseline, pt doesn't talk much due to dementia.   Pt was found to have elevated LFTs, sono with significant billiary dilation.     1. Biliary dilation on sono:  -for mrcp under sedation per d/w gi    2. Advanced alzheimer's dementia:  -at baseline    3. h/o CAD:  -continue plavix/bb/statin    4. DVT px.

## 2017-05-03 NOTE — CONSULT NOTE ADULT - SUBJECTIVE AND OBJECTIVE BOX
HPI:  78 y/o F with a h/o Advanced dementia, CAD NSTEMI in 8/2015, Hyperlipidemia, who was brought by family for an episode of urinary incontinence today.  Family member states pt had 2 episodes of urinary incontinence in the passed 6 months. No recent falls or trauma. No fevers or vomiting. Good appetite. At baseline, pt doesn't talk much due to dementia.     Pt was found to have elevated LFTs, ALP and bilirubin and sonogram suggested biliary ductal dilatation.      Pt overnight became delirious in ER trying to get out of bed and was not oriented to time, placer or person. (02 May 2017 05:00)      PAST MEDICAL & SURGICAL HISTORY:  Dementia  No significant past surgical history      MEDICATIONS  (STANDING):  atorvastatin 20milliGRAM(s) Oral at bedtime  clopidogrel Tablet 75milliGRAM(s) Oral daily  metoprolol succinate ER 25milliGRAM(s) Oral daily  donepezil 10milliGRAM(s) Oral at bedtime  memantine 10milliGRAM(s) Oral two times a day  dorzolamide 2% Ophthalmic Solution 1Drop(s) Both EYES two times a day  multivitamin 1Tablet(s) Oral daily  insulin lispro (HumaLOG) corrective regimen sliding scale  SubCutaneous three times a day before meals  dextrose 5%. 1000milliLiter(s) IV Continuous <Continuous>  dextrose 50% Injectable 12.5Gram(s) IV Push once    MEDICATIONS  (PRN):  dextrose Gel 1Dose(s) Oral once PRN Blood Glucose LESS THAN 70 milliGRAM(s)/deciliter  glucagon  Injectable 1milliGRAM(s) IntraMuscular once PRN Glucose LESS THAN 70 milligrams/deciliter      Allergies    No Known Allergies    Intolerances        SOCIAL HISTORY:    FAMILY HISTORY:   Non-contributory    REVIEW OF SYSTEMS      General:	    Respiratory and Thorax:  	  Cardiovascular:	    Gastrointestinal:	    Musculoskeletal:	   Vital Signs Last 24 Hrs  T(C): 36.7, Max: 36.7 (05-03 @ 05:25)  T(F): 98, Max: 98 (05-03 @ 05:25)  HR: 66 (66 - 82)  BP: 141/78 (127/69 - 142/63)  BP(mean): --  RR: 16 (16 - 16)  SpO2: 98% (96% - 98%)    HEENT :No Pallor.No icterus. EOMI,PERLAA  Chest : Clear to Auscultation  CVS : S1S2 Normal.No murmurs.  Abdomen: Soft.Non tender .Normal bowel sounds.No Organomegaly.  CNS: Alert.Oriented to Time,Place and Person.No focal deficit.  EXT: Normal Range of motion.No pitting edema.    LABS:                        13.3   7.4   )-----------( 190      ( 02 May 2017 05:37 )             39.2     05-03    145  |  109<H>  |  11  ----------------------------<  118<H>  4.2   |  27  |  0.77    Ca    9.1      03 May 2017 08:34  Mg     2.1     05-02    TPro  6.5  /  Alb  2.8<L>  /  TBili  2.2<H>  /  DBili  x   /  AST  87<H>  /  ALT  110<H>  /  AlkPhos  318<H>  05-03      LIVER FUNCTIONS - ( 03 May 2017 08:34 )  Alb: 2.8 g/dL / Pro: 6.5 gm/dL / ALK PHOS: 318 U/L / ALT: 110 U/L / AST: 87 U/L / GGT: x             RADIOLOGY & ADDITIONAL STUDIES:

## 2017-05-03 NOTE — DISCHARGE NOTE ADULT - PATIENT PORTAL LINK FT
“You can access the FollowHealth Patient Portal, offered by [facility], by registering with the following website: [email or web address]”

## 2017-05-07 LAB
CULTURE RESULTS: SIGNIFICANT CHANGE UP
CULTURE RESULTS: SIGNIFICANT CHANGE UP
SPECIMEN SOURCE: SIGNIFICANT CHANGE UP
SPECIMEN SOURCE: SIGNIFICANT CHANGE UP

## 2017-05-08 DIAGNOSIS — G30.9 ALZHEIMER'S DISEASE, UNSPECIFIED: ICD-10-CM

## 2017-05-08 DIAGNOSIS — Z90.49 ACQUIRED ABSENCE OF OTHER SPECIFIED PARTS OF DIGESTIVE TRACT: ICD-10-CM

## 2017-05-08 DIAGNOSIS — K83.8 OTHER SPECIFIED DISEASES OF BILIARY TRACT: ICD-10-CM

## 2017-05-08 DIAGNOSIS — E78.5 HYPERLIPIDEMIA, UNSPECIFIED: ICD-10-CM

## 2017-05-08 DIAGNOSIS — Z79.02 LONG TERM (CURRENT) USE OF ANTITHROMBOTICS/ANTIPLATELETS: ICD-10-CM

## 2017-05-08 DIAGNOSIS — I25.10 ATHEROSCLEROTIC HEART DISEASE OF NATIVE CORONARY ARTERY WITHOUT ANGINA PECTORIS: ICD-10-CM

## 2017-05-08 DIAGNOSIS — Z66 DO NOT RESUSCITATE: ICD-10-CM

## 2017-05-08 DIAGNOSIS — I25.2 OLD MYOCARDIAL INFARCTION: ICD-10-CM

## 2017-05-08 DIAGNOSIS — R94.5 ABNORMAL RESULTS OF LIVER FUNCTION STUDIES: ICD-10-CM

## 2017-05-08 DIAGNOSIS — F02.80 DEMENTIA IN OTHER DISEASES CLASSIFIED ELSEWHERE, UNSPECIFIED SEVERITY, WITHOUT BEHAVIORAL DISTURBANCE, PSYCHOTIC DISTURBANCE, MOOD DISTURBANCE, AND ANXIETY: ICD-10-CM

## 2017-05-08 DIAGNOSIS — R32 UNSPECIFIED URINARY INCONTINENCE: ICD-10-CM

## 2018-07-31 ENCOUNTER — EMERGENCY (EMERGENCY)
Facility: HOSPITAL | Age: 78
LOS: 0 days | Discharge: HOME | End: 2018-07-31
Attending: EMERGENCY MEDICINE | Admitting: EMERGENCY MEDICINE
Payer: MEDICARE

## 2018-07-31 VITALS
TEMPERATURE: 98 F | HEART RATE: 78 BPM | DIASTOLIC BLOOD PRESSURE: 81 MMHG | SYSTOLIC BLOOD PRESSURE: 124 MMHG | RESPIRATION RATE: 18 BRPM | OXYGEN SATURATION: 100 %

## 2018-07-31 VITALS — WEIGHT: 179.9 LBS

## 2018-07-31 DIAGNOSIS — R41.82 ALTERED MENTAL STATUS, UNSPECIFIED: ICD-10-CM

## 2018-07-31 DIAGNOSIS — Z79.02 LONG TERM (CURRENT) USE OF ANTITHROMBOTICS/ANTIPLATELETS: ICD-10-CM

## 2018-07-31 DIAGNOSIS — F03.90 UNSPECIFIED DEMENTIA WITHOUT BEHAVIORAL DISTURBANCE: ICD-10-CM

## 2018-07-31 LAB
ALBUMIN SERPL ELPH-MCNC: 3.5 G/DL — SIGNIFICANT CHANGE UP (ref 3.3–5)
ALP SERPL-CCNC: 134 U/L — HIGH (ref 40–120)
ALT FLD-CCNC: 143 U/L — HIGH (ref 12–78)
ANION GAP SERPL CALC-SCNC: 3 MMOL/L — LOW (ref 5–17)
AST SERPL-CCNC: 75 U/L — HIGH (ref 15–37)
BASOPHILS # BLD AUTO: 0.08 K/UL — SIGNIFICANT CHANGE UP (ref 0–0.2)
BASOPHILS NFR BLD AUTO: 1.4 % — SIGNIFICANT CHANGE UP (ref 0–2)
BILIRUB SERPL-MCNC: 1.1 MG/DL — SIGNIFICANT CHANGE UP (ref 0.2–1.2)
BUN SERPL-MCNC: 23 MG/DL — SIGNIFICANT CHANGE UP (ref 7–23)
CALCIUM SERPL-MCNC: 9.4 MG/DL — SIGNIFICANT CHANGE UP (ref 8.5–10.1)
CHLORIDE SERPL-SCNC: 109 MMOL/L — HIGH (ref 96–108)
CO2 SERPL-SCNC: 28 MMOL/L — SIGNIFICANT CHANGE UP (ref 22–31)
CREAT SERPL-MCNC: 1.06 MG/DL — SIGNIFICANT CHANGE UP (ref 0.5–1.3)
EOSINOPHIL # BLD AUTO: 0.19 K/UL — SIGNIFICANT CHANGE UP (ref 0–0.5)
EOSINOPHIL NFR BLD AUTO: 3.2 % — SIGNIFICANT CHANGE UP (ref 0–6)
GLUCOSE SERPL-MCNC: 111 MG/DL — HIGH (ref 70–99)
HCT VFR BLD CALC: 41.3 % — SIGNIFICANT CHANGE UP (ref 34.5–45)
HGB BLD-MCNC: 13.9 G/DL — SIGNIFICANT CHANGE UP (ref 11.5–15.5)
IMM GRANULOCYTES NFR BLD AUTO: 0.3 % — SIGNIFICANT CHANGE UP (ref 0–1.5)
LYMPHOCYTES # BLD AUTO: 1.47 K/UL — SIGNIFICANT CHANGE UP (ref 1–3.3)
LYMPHOCYTES # BLD AUTO: 25 % — SIGNIFICANT CHANGE UP (ref 13–44)
MCHC RBC-ENTMCNC: 32.3 PG — SIGNIFICANT CHANGE UP (ref 27–34)
MCHC RBC-ENTMCNC: 33.7 GM/DL — SIGNIFICANT CHANGE UP (ref 32–36)
MCV RBC AUTO: 96 FL — SIGNIFICANT CHANGE UP (ref 80–100)
MONOCYTES # BLD AUTO: 0.51 K/UL — SIGNIFICANT CHANGE UP (ref 0–0.9)
MONOCYTES NFR BLD AUTO: 8.7 % — SIGNIFICANT CHANGE UP (ref 2–14)
NEUTROPHILS # BLD AUTO: 3.6 K/UL — SIGNIFICANT CHANGE UP (ref 1.8–7.4)
NEUTROPHILS NFR BLD AUTO: 61.4 % — SIGNIFICANT CHANGE UP (ref 43–77)
NRBC # BLD: 0 /100 WBCS — SIGNIFICANT CHANGE UP (ref 0–0)
PLATELET # BLD AUTO: 183 K/UL — SIGNIFICANT CHANGE UP (ref 150–400)
POTASSIUM SERPL-MCNC: 4.2 MMOL/L — SIGNIFICANT CHANGE UP (ref 3.5–5.3)
POTASSIUM SERPL-SCNC: 4.2 MMOL/L — SIGNIFICANT CHANGE UP (ref 3.5–5.3)
PROT SERPL-MCNC: 8 GM/DL — SIGNIFICANT CHANGE UP (ref 6–8.3)
RBC # BLD: 4.3 M/UL — SIGNIFICANT CHANGE UP (ref 3.8–5.2)
RBC # FLD: 12.8 % — SIGNIFICANT CHANGE UP (ref 10.3–14.5)
SODIUM SERPL-SCNC: 140 MMOL/L — SIGNIFICANT CHANGE UP (ref 135–145)
TROPONIN I SERPL-MCNC: <0.015 NG/ML — SIGNIFICANT CHANGE UP (ref 0.01–0.04)
WBC # BLD: 5.87 K/UL — SIGNIFICANT CHANGE UP (ref 3.8–10.5)
WBC # FLD AUTO: 5.87 K/UL — SIGNIFICANT CHANGE UP (ref 3.8–10.5)

## 2018-07-31 PROCEDURE — 71046 X-RAY EXAM CHEST 2 VIEWS: CPT | Mod: 26

## 2018-07-31 PROCEDURE — 99285 EMERGENCY DEPT VISIT HI MDM: CPT

## 2018-07-31 PROCEDURE — 93010 ELECTROCARDIOGRAM REPORT: CPT

## 2018-07-31 PROCEDURE — 70450 CT HEAD/BRAIN W/O DYE: CPT | Mod: 26

## 2018-07-31 RX ORDER — SODIUM CHLORIDE 9 MG/ML
1000 INJECTION INTRAMUSCULAR; INTRAVENOUS; SUBCUTANEOUS ONCE
Qty: 0 | Refills: 0 | Status: COMPLETED | OUTPATIENT
Start: 2018-07-31 | End: 2018-07-31

## 2018-07-31 RX ADMIN — SODIUM CHLORIDE 1000 MILLILITER(S): 9 INJECTION INTRAMUSCULAR; INTRAVENOUS; SUBCUTANEOUS at 18:00

## 2018-07-31 NOTE — ED ADULT TRIAGE NOTE - CHIEF COMPLAINT QUOTE
Son states pt was lethargic and sluggish since last night and through the morning until 1500. pt took longer than usual to eat and took a nap (pt takes frequent naps). pt now at baseline  per son with no neuro cognitive or motor deficicts noted outside baseline

## 2018-07-31 NOTE — ED ADULT NURSE NOTE - CHPI ED NUR SYMPTOMS NEG
no decreased eating/drinking/no dizziness/no nausea/no tingling/no weakness/no chills/no fever/no pain/no vomiting

## 2018-07-31 NOTE — ED ADULT NURSE NOTE - NSIMPLEMENTINTERV_GEN_ALL_ED
Implemented All Fall with Harm Risk Interventions:  [place] to call system. Call bell, personal items and telephone within reach. Instruct patient to call for assistance. Room bathroom lighting operational. Non-slip footwear when patient is off stretcher. Physically safe environment: no spills, clutter or unnecessary equipment. Stretcher in lowest position, wheels locked, appropriate side rails in place. Provide visual cue, wrist band, yellow gown, etc. Monitor gait and stability. Monitor for mental status changes and reorient to person, place, and time. Review medications for side effects contributing to fall risk. Reinforce activity limits and safety measures with patient and family. Provide visual clues: red socks.

## 2018-07-31 NOTE — ED STATDOCS - PROGRESS NOTE DETAILS
Osmin LARSEN for ED Attending Dr. Redmond: 77 y/o female with PMHx of dementia presents to the ED c/o fatigue, decreased responsiveness as per son yesterday into today morning. +decreased PO intake at the time of sx. Returned to baseline. Son states that sx have occurred before but never with appetite involvement. Denies fever, N/V, dysuria. PCP Dr. Landeros recommended pt come to ED for r/o TIA. Family would like to pt home if everything is normal. Will send pt to the Main ED for further evaluation.

## 2018-07-31 NOTE — ED PROVIDER NOTE - OBJECTIVE STATEMENT
77 y/o female with PMHx of dementia presents to the ED c/o fatigue, decreased responsiveness as per son yesterday into today morning. +decreased PO intake at the time of sx. Returned to baseline at 3pm today. Pt states that yesterday pt was very verbal. Son states that sx have occurred before where pt is very verbal one day and the next very lethargic but never with appetite involvement. Denies fever, N/V, dysuria. PCP Dr. Landreos recommended pt come to ED for r/o TIA. Family would like to pt home if everything is normal. No sick contact. Pt lives at home with an aide. HPI given by family as pt has dementia.

## 2018-07-31 NOTE — ED PROVIDER NOTE - CONSTITUTIONAL, MLM
normal... Nonverbal. well nourished, awake, and in no apparent distress. At baseline as per son. Non cooperative.

## 2018-08-01 PROBLEM — F03.90 UNSPECIFIED DEMENTIA, UNSPECIFIED SEVERITY, WITHOUT BEHAVIORAL DISTURBANCE, PSYCHOTIC DISTURBANCE, MOOD DISTURBANCE, AND ANXIETY: Chronic | Status: ACTIVE | Noted: 2017-05-02

## 2018-12-11 NOTE — H&P ADULT - DOES THIS PATIENT HAVE A HISTORY OF OR HAS BEEN DX WITH HEART FAILURE?
no Information: Selecting Yes will display possible errors in your note based on the variables you have selected. This validation is only offered as a suggestion for you. PLEASE NOTE THAT THE VALIDATION TEXT WILL BE REMOVED WHEN YOU FINALIZE YOUR NOTE. IF YOU WANT TO FAX A PRELIMINARY NOTE YOU WILL NEED TO TOGGLE THIS TO 'NO' IF YOU DO NOT WANT IT IN YOUR FAXED NOTE.

## 2019-03-17 NOTE — ED PROVIDER NOTE - NS_ ATTENDINGSCRIBEDETAILS _ED_A_ED_FT
No
I, Magnus Al MD,  performed the initial face to face bedside interview with this patient regarding history of present illness, review of symptoms and relevant past medical, social and family history.  I completed an independent physical examination.    The history, relevant review of systems, past medical and surgical history, medical decision making, and physical examination was documented by the scribe in my presence and I attest to the accuracy of the documentation.

## 2019-06-24 NOTE — ED PROVIDER NOTE - MUSCULOSKELETAL, MLM
Received faxed request from [facility] for 90 day supply of Lialda.      RX e-scribed to pharmacy.     No focal swelling, deformities, or tenderness.

## 2019-08-21 NOTE — ED PROVIDER NOTE - MEDICAL DECISION MAKING DETAILS
negative Plan for IV fluids, labs, CT head, CXR, reassess. Soft, non-tender, no hepatosplenomegaly, normal bowel sounds

## 2020-01-05 ENCOUNTER — EMERGENCY (EMERGENCY)
Facility: HOSPITAL | Age: 80
LOS: 0 days | Discharge: HOME | End: 2020-01-05
Attending: EMERGENCY MEDICINE
Payer: MEDICARE

## 2020-01-05 VITALS
HEART RATE: 84 BPM | SYSTOLIC BLOOD PRESSURE: 140 MMHG | RESPIRATION RATE: 18 BRPM | DIASTOLIC BLOOD PRESSURE: 71 MMHG | OXYGEN SATURATION: 100 %

## 2020-01-05 VITALS — HEIGHT: 67 IN | WEIGHT: 190.04 LBS

## 2020-01-05 DIAGNOSIS — R05 COUGH: ICD-10-CM

## 2020-01-05 DIAGNOSIS — F03.90 UNSPECIFIED DEMENTIA WITHOUT BEHAVIORAL DISTURBANCE: ICD-10-CM

## 2020-01-05 DIAGNOSIS — I25.2 OLD MYOCARDIAL INFARCTION: ICD-10-CM

## 2020-01-05 DIAGNOSIS — Z79.899 OTHER LONG TERM (CURRENT) DRUG THERAPY: ICD-10-CM

## 2020-01-05 DIAGNOSIS — E78.5 HYPERLIPIDEMIA, UNSPECIFIED: ICD-10-CM

## 2020-01-05 DIAGNOSIS — Z79.01 LONG TERM (CURRENT) USE OF ANTICOAGULANTS: ICD-10-CM

## 2020-01-05 LAB
ALBUMIN SERPL ELPH-MCNC: 3.1 G/DL — LOW (ref 3.3–5)
ALP SERPL-CCNC: 93 U/L — SIGNIFICANT CHANGE UP (ref 40–120)
ALT FLD-CCNC: 23 U/L — SIGNIFICANT CHANGE UP (ref 12–78)
ANION GAP SERPL CALC-SCNC: 4 MMOL/L — LOW (ref 5–17)
APTT BLD: 35.7 SEC — SIGNIFICANT CHANGE UP (ref 27.5–36.3)
AST SERPL-CCNC: 21 U/L — SIGNIFICANT CHANGE UP (ref 15–37)
BASOPHILS # BLD AUTO: 0.09 K/UL — SIGNIFICANT CHANGE UP (ref 0–0.2)
BASOPHILS NFR BLD AUTO: 1.2 % — SIGNIFICANT CHANGE UP (ref 0–2)
BILIRUB SERPL-MCNC: 1.1 MG/DL — SIGNIFICANT CHANGE UP (ref 0.2–1.2)
BUN SERPL-MCNC: 21 MG/DL — SIGNIFICANT CHANGE UP (ref 7–23)
CALCIUM SERPL-MCNC: 9.5 MG/DL — SIGNIFICANT CHANGE UP (ref 8.5–10.1)
CHLORIDE SERPL-SCNC: 107 MMOL/L — SIGNIFICANT CHANGE UP (ref 96–108)
CO2 SERPL-SCNC: 28 MMOL/L — SIGNIFICANT CHANGE UP (ref 22–31)
CREAT SERPL-MCNC: 1.15 MG/DL — SIGNIFICANT CHANGE UP (ref 0.5–1.3)
EOSINOPHIL # BLD AUTO: 0.4 K/UL — SIGNIFICANT CHANGE UP (ref 0–0.5)
EOSINOPHIL NFR BLD AUTO: 5.3 % — SIGNIFICANT CHANGE UP (ref 0–6)
FLU A RESULT: SIGNIFICANT CHANGE UP
FLU A RESULT: SIGNIFICANT CHANGE UP
FLUAV AG NPH QL: SIGNIFICANT CHANGE UP
FLUBV AG NPH QL: SIGNIFICANT CHANGE UP
GLUCOSE SERPL-MCNC: 118 MG/DL — HIGH (ref 70–99)
HCT VFR BLD CALC: 40 % — SIGNIFICANT CHANGE UP (ref 34.5–45)
HGB BLD-MCNC: 12.8 G/DL — SIGNIFICANT CHANGE UP (ref 11.5–15.5)
IMM GRANULOCYTES NFR BLD AUTO: 0.3 % — SIGNIFICANT CHANGE UP (ref 0–1.5)
INR BLD: 1.73 RATIO — HIGH (ref 0.88–1.16)
LYMPHOCYTES # BLD AUTO: 1.5 K/UL — SIGNIFICANT CHANGE UP (ref 1–3.3)
LYMPHOCYTES # BLD AUTO: 19.9 % — SIGNIFICANT CHANGE UP (ref 13–44)
MCHC RBC-ENTMCNC: 30.6 PG — SIGNIFICANT CHANGE UP (ref 27–34)
MCHC RBC-ENTMCNC: 32 GM/DL — SIGNIFICANT CHANGE UP (ref 32–36)
MCV RBC AUTO: 95.7 FL — SIGNIFICANT CHANGE UP (ref 80–100)
MONOCYTES # BLD AUTO: 0.72 K/UL — SIGNIFICANT CHANGE UP (ref 0–0.9)
MONOCYTES NFR BLD AUTO: 9.5 % — SIGNIFICANT CHANGE UP (ref 2–14)
NEUTROPHILS # BLD AUTO: 4.82 K/UL — SIGNIFICANT CHANGE UP (ref 1.8–7.4)
NEUTROPHILS NFR BLD AUTO: 63.8 % — SIGNIFICANT CHANGE UP (ref 43–77)
NT-PROBNP SERPL-SCNC: 650 PG/ML — HIGH (ref 0–450)
PLATELET # BLD AUTO: 185 K/UL — SIGNIFICANT CHANGE UP (ref 150–400)
POTASSIUM SERPL-MCNC: 3.9 MMOL/L — SIGNIFICANT CHANGE UP (ref 3.5–5.3)
POTASSIUM SERPL-SCNC: 3.9 MMOL/L — SIGNIFICANT CHANGE UP (ref 3.5–5.3)
PROT SERPL-MCNC: 7.5 GM/DL — SIGNIFICANT CHANGE UP (ref 6–8.3)
PROTHROM AB SERPL-ACNC: 19.5 SEC — HIGH (ref 10–12.9)
RBC # BLD: 4.18 M/UL — SIGNIFICANT CHANGE UP (ref 3.8–5.2)
RBC # FLD: 12.8 % — SIGNIFICANT CHANGE UP (ref 10.3–14.5)
RSV RESULT: SIGNIFICANT CHANGE UP
RSV RNA RESP QL NAA+PROBE: SIGNIFICANT CHANGE UP
SODIUM SERPL-SCNC: 139 MMOL/L — SIGNIFICANT CHANGE UP (ref 135–145)
TROPONIN I SERPL-MCNC: <0.015 NG/ML — SIGNIFICANT CHANGE UP (ref 0.01–0.04)
WBC # BLD: 7.55 K/UL — SIGNIFICANT CHANGE UP (ref 3.8–10.5)
WBC # FLD AUTO: 7.55 K/UL — SIGNIFICANT CHANGE UP (ref 3.8–10.5)

## 2020-01-05 PROCEDURE — 99284 EMERGENCY DEPT VISIT MOD MDM: CPT

## 2020-01-05 PROCEDURE — 87631 RESP VIRUS 3-5 TARGETS: CPT

## 2020-01-05 PROCEDURE — 93010 ELECTROCARDIOGRAM REPORT: CPT

## 2020-01-05 PROCEDURE — 83880 ASSAY OF NATRIURETIC PEPTIDE: CPT

## 2020-01-05 PROCEDURE — 85610 PROTHROMBIN TIME: CPT

## 2020-01-05 PROCEDURE — 93005 ELECTROCARDIOGRAM TRACING: CPT

## 2020-01-05 PROCEDURE — 85025 COMPLETE CBC W/AUTO DIFF WBC: CPT

## 2020-01-05 PROCEDURE — 85730 THROMBOPLASTIN TIME PARTIAL: CPT

## 2020-01-05 PROCEDURE — 36415 COLL VENOUS BLD VENIPUNCTURE: CPT

## 2020-01-05 PROCEDURE — 71045 X-RAY EXAM CHEST 1 VIEW: CPT | Mod: 26

## 2020-01-05 PROCEDURE — 84484 ASSAY OF TROPONIN QUANT: CPT

## 2020-01-05 PROCEDURE — 71045 X-RAY EXAM CHEST 1 VIEW: CPT

## 2020-01-05 PROCEDURE — 80053 COMPREHEN METABOLIC PANEL: CPT

## 2020-01-05 PROCEDURE — 99284 EMERGENCY DEPT VISIT MOD MDM: CPT | Mod: 25

## 2020-01-05 NOTE — ED STATDOCS - PROGRESS NOTE DETAILS
Osmin CLARK for ED attending, Dr. Chinchilla: 81 y/o F with PMHx of dementia presenting to the ED c/o cough. +SOB +nasal congestion Per aid and daughter, patient was coughing all night, and was unable to sleep. Noted that she has been having increased SOB. Patient is non verbal, and not responsive to questions.  Took Robitussin with no relief. Came to the ED for further workup. Denies any fever, N/V/D. Will send to main for further evaluation.

## 2020-01-05 NOTE — ED PROVIDER NOTE - OBJECTIVE STATEMENT
81 y/o female with PMHx of Dementia, HLD, MI, presenting to the ED c/o cough x 1 week. Per aid and daughter, patient had worsening cough since last night, and was unable to sleep. Patient is non verbal, and not responsive to questions. Took Robitussin with no relief. Came to the ED for further workup. Denies any fever, N/V/D. On diuretic 3 months ago, but not anymore. NKDA. PMD: Dr. Erasto Lai.

## 2020-01-05 NOTE — ED ADULT NURSE NOTE - NSIMPLEMENTINTERV_GEN_ALL_ED
Implemented All Fall with Harm Risk Interventions:  [place] to call system. Call bell, personal items and telephone within reach. Instruct patient to call for assistance. Room bathroom lighting operational. Non-slip footwear when patient is off stretcher. Physically safe environment: no spills, clutter or unnecessary equipment. Stretcher in lowest position, wheels locked, appropriate side rails in place. Provide visual cue, wrist band, yellow gown, etc. Monitor gait and stability. Monitor for mental status changes and reorient to person, place, and time. Review medications for side effects contributing to fall risk. Reinforce activity limits and safety measures with patient and family. Provide visual clues: red socks.

## 2020-01-05 NOTE — ED ADULT NURSE NOTE - CHPI ED NUR SYMPTOMS NEG
no wheezing/no chest pain/no edema/no headache/no hemoptysis/no shortness of breath/no body aches/no diaphoresis/no fever/no chills

## 2020-01-05 NOTE — ED PROVIDER NOTE - PHYSICAL EXAMINATION
Constitutional: NAD AAOx3  Eyes: PERRLA EOMI +crusting to b/l eyes  Head: Normocephalic atraumatic  Mouth: MMM  Cardiac: regular rate   Resp: Lungs CTAB  GI: Abd s/nt/nd  Neuro: CN2-12 intact  Skin: No rashes  MSK: no LE edema, normal peripheral pulses Constitutional: NAD resting comfortably laying down  Eyes: PERRLA EOMI +crusting to b/l eyes  Head: Normocephalic atraumatic  Mouth: MMM  Cardiac: regular rate   Resp: Lungs CTAB  GI: Abd s/nt/nd  Neuro: CN2-12 intact  Skin: No rashes  MSK: no LE edema, normal peripheral pulses

## 2020-01-05 NOTE — ED PROVIDER NOTE - PATIENT PORTAL LINK FT
You can access the FollowMyHealth Patient Portal offered by [facility] by registering at the following website: [email or web address]. By joining [email or web address]’s FollowMyHealth portal, you will also be able to view your health information using other applications (apps) compatible with our system.

## 2020-01-05 NOTE — ED PROVIDER NOTE - PROGRESS NOTE DETAILS
labs xray unremarkable. vss pt extremely well appearing - spoke with family and they want to take patient home. states that they can follow up with PMD in office. will d/c with follow up and strict return precautions. Aniceto He M.D., Attending Physician

## 2020-01-05 NOTE — ED PROVIDER NOTE - NS_ ATTENDINGSCRIBEDETAILS _ED_A_ED_FT
I, Aniceto He MD,  performed the initial face to face bedside interview with this patient regarding history of present illness, review of symptoms and relevant past medical, social and family history.  I completed an independent physical examination.  I was the initial provider who evaluated this patient.  The history, relevant review of systems, past medical and surgical history, medical decision making, and physical examination was documented by the scribe in my presence and I attest to the accuracy of the documentation.

## 2020-01-05 NOTE — ED ADULT NURSE NOTE - OBJECTIVE STATEMENT
pt is a 81 y/o w/o pmhx of dementia, hld presenting to ED for eval of worsening cough overnight which inhibited her from sleeping. pt non verbal at baseline.

## 2020-01-05 NOTE — ED PROVIDER NOTE - CLINICAL SUMMARY MEDICAL DECISION MAKING FREE TEXT BOX
80 F hx of Dementia, HLD, presents to the ED for cough past week. cough was worst last night, pt couldn't sleep also with crusting eyes. pt is severe dementia, non verbal. family states these are her only sx. exam with crusting to eyes otherwise non focal likely virus. will obtain labs, x rays, EKG, flu test and reassess. 80 F hx of Dementia, HLD, presents to the ED for cough past week. cough was worst last night, pt couldn't sleep also with crusting eyes. pt with severe dementia, non verbal. family states these are her only sx. exam with crusting to eyes otherwise non focal likely virus. will obtain labs, x rays, EKG, flu test and reassess.

## 2020-01-05 NOTE — ED PROVIDER NOTE - NSFOLLOWUPINSTRUCTIONS_ED_ALL_ED_FT
1. return for worsening symptoms or anything concerning to you  2. take all home meds as prescribed  3. follow up with your pmd call to make an appointment    Acute Cough    WHAT YOU NEED TO KNOW:    An acute cough can last up to 3 weeks. Common causes of an acute cough include a cold, allergies, or a lung infection.     DISCHARGE INSTRUCTIONS:    Return to the emergency department if:     You have trouble breathing or feel short of breath.      You cough up blood, or you see blood in your mucus.       You faint or feel weak or dizzy.       You have chest pain when you cough or take a deep breath.       You have new wheezing.     Contact your healthcare provider if:     You have a fever.       Your cough lasts longer than 4 weeks.       Your symptoms do not improve with treatment.       You have questions or concerns about your condition or care.     Medicines:     Medicines may be needed to stop the cough, decrease swelling in your airways, or help open your airways. Medicine may also be given to help you cough up mucus. Ask your healthcare provider what over-the-counter medicines you can take. If you have an infection caused by bacteria, you may need antibiotics.       Take your medicine as directed. Contact your healthcare provider if you think your medicine is not helping or if you have side effects. Tell him or her if you are allergic to any medicine. Keep a list of the medicines, vitamins, and herbs you take. Include the amounts, and when and why you take them. Bring the list or the pill bottles to follow-up visits. Carry your medicine list with you in case of an emergency.    Manage your symptoms:     Do not smoke and stay away from others who smoke. Nicotine and other chemicals in cigarettes and cigars can cause lung damage and make your cough worse. Ask your healthcare provider for information if you currently smoke and need help to quit. E-cigarettes or smokeless tobacco still contain nicotine. Talk to your healthcare provider before you use these products.       Drink extra liquids as directed. Liquids will help thin and loosen mucus so you can cough it up. Liquids will also help prevent dehydration. Examples of good liquids to drink include water, fruit juice, and broth. Do not drink liquids that contain caffeine. Caffeine can increase your risk for dehydration. Ask your healthcare provider how much liquid to drink each day.       Rest as directed. Do not do activities that make your cough worse, such as exercise.       Use a humidifier or vaporizer. Use a cool mist humidifier or a vaporizer to increase air moisture in your home. This may make it easier for you to breathe and help decrease your cough.       Eat 2 to 5 mL of honey 2 times each day. Honey can help thin mucus and decrease your cough.       Use cough drops or lozenges. These can help decrease throat irritation and your cough.     Follow up with your healthcare provider as directed: Write down your questions so you remember to ask them during your visits.

## 2021-07-22 NOTE — PATIENT PROFILE ADULT. - SPIRITUAL CULTURAL, RELIGIOUS PRACTICES/VALUES, PROFILE
Patient (s)  given copy of dc instructions and 0 paper script(s) and 1 electronic scripts. Patient (s)  verbalized understanding of instructions and script (s). Patient given a current medication reconciliation form and verbalized understanding of their medications. Patient (s) verbalized understanding of the importance of discussing medications with  his or her physician or clinic they will be following up with. Patient alert and oriented and in no acute distress. Patient offered wheelchair from treatment area to hospital entrance, patient declined wheelchair. unable to assess r/t advanced dementia

## 2022-10-05 NOTE — PATIENT PROFILE ADULT. - AS SC BRADEN MOISTURE
Problem: Behavior Regulation Impairment (Psychotic Signs/Symptoms)  Goal: Improved Behavioral Control (Psychotic Signs/Symptoms)  Outcome: Ongoing, Not Progressing   Goal Outcome Evaluation:    Plan of Care Reviewed With: patient     Patient remains the same. He is isolative and withdrawn. He was napping majority of the shift. He was observed talking to self. He denied SI/SIB nor hallucinations. Denied wishing himself to be dead. He is alert and oriented to self only. Speech is pressured and tangential. His eye contact is intermittent. He is sleeping good. He ate approximately 75% of his dinner. He is med compliant. BP-97/68; P-98. Denies dizziness. Encouraged to push fluids. He refused to take a shower. Declined to attend groups this shift.               (2) very moist